# Patient Record
Sex: FEMALE | Race: OTHER | HISPANIC OR LATINO
[De-identification: names, ages, dates, MRNs, and addresses within clinical notes are randomized per-mention and may not be internally consistent; named-entity substitution may affect disease eponyms.]

---

## 2022-11-08 PROBLEM — Z00.00 ENCOUNTER FOR PREVENTIVE HEALTH EXAMINATION: Status: ACTIVE | Noted: 2022-11-08

## 2022-11-14 ENCOUNTER — ASOB RESULT (OUTPATIENT)
Age: 32
End: 2022-11-14

## 2022-11-14 ENCOUNTER — APPOINTMENT (OUTPATIENT)
Dept: ANTEPARTUM | Facility: CLINIC | Age: 32
End: 2022-11-14

## 2022-11-14 PROCEDURE — 76856 US EXAM PELVIC COMPLETE: CPT | Mod: 59

## 2022-11-14 PROCEDURE — 76830 TRANSVAGINAL US NON-OB: CPT

## 2023-04-17 ENCOUNTER — NON-APPOINTMENT (OUTPATIENT)
Age: 33
End: 2023-04-17

## 2023-04-17 ENCOUNTER — APPOINTMENT (OUTPATIENT)
Dept: OBGYN | Facility: CLINIC | Age: 33
End: 2023-04-17
Payer: MEDICAID

## 2023-04-17 VITALS
HEIGHT: 64 IN | DIASTOLIC BLOOD PRESSURE: 87 MMHG | BODY MASS INDEX: 28.34 KG/M2 | WEIGHT: 166 LBS | SYSTOLIC BLOOD PRESSURE: 123 MMHG

## 2023-04-17 DIAGNOSIS — E28.2 POLYCYSTIC OVARIAN SYNDROME: ICD-10-CM

## 2023-04-17 DIAGNOSIS — Z78.9 OTHER SPECIFIED HEALTH STATUS: ICD-10-CM

## 2023-04-17 DIAGNOSIS — Z82.49 FAMILY HISTORY OF ISCHEMIC HEART DISEASE AND OTHER DISEASES OF THE CIRCULATORY SYSTEM: ICD-10-CM

## 2023-04-17 PROCEDURE — 99385 PREV VISIT NEW AGE 18-39: CPT

## 2023-04-17 PROCEDURE — 99202 OFFICE O/P NEW SF 15 MIN: CPT | Mod: 25

## 2023-04-17 PROCEDURE — 81025 URINE PREGNANCY TEST: CPT

## 2023-04-17 NOTE — PLAN
[FreeTextEntry1] : IUP at 7w1d gestation not CW LMP \par +FHR\par Dating sono referral given\par \par Prenatals sent\par \par PAP smear done\par CG/CT - done\par \par RTO 2 weeks for NPN

## 2023-04-17 NOTE — PHYSICAL EXAM
[Chaperone Present] : A chaperone was present in the examining room during all aspects of the physical examination [Appropriately responsive] : appropriately responsive [Alert] : alert [No Acute Distress] : no acute distress [No Lymphadenopathy] : no lymphadenopathy [Regular Rate Rhythm] : regular rate rhythm [No Murmurs] : no murmurs [Soft] : soft [Clear to Auscultation B/L] : clear to auscultation bilaterally [Non-tender] : non-tender [Non-distended] : non-distended [No HSM] : No HSM [No Lesions] : no lesions [No Mass] : no mass [Oriented x3] : oriented x3 [Examination Of The Breasts] : a normal appearance [No Masses] : no breast masses were palpable [Labia Majora] : normal [Labia Minora] : normal [Normal] : normal [Enlarged ___ wks] : enlarged [unfilled] ~Uweeks [Uterine Adnexae] : normal [Tenderness] : nontender

## 2023-04-17 NOTE — HISTORY OF PRESENT ILLNESS
[N] : Patient does not use contraception [Y] : Positive pregnancy history [Frequency: Q ___ days] : menstrual periods occur approximately every [unfilled] days [Menarche Age: ____] : age at menarche was [unfilled] [FreeTextEntry1] : 32yo  with PCOS presents to establish care and confirm pregnancy\par LMP 03/15/2023\par \par  [PGxTotal] : 1 [PGHxFullTerm] : 0 [PGHxPremature] : 0 [PGHxAbortions] : 0 [PGHxABInduced] : 0 [[facility]] : 0 [PGHxABSpont] : 0 [PGHxEctopic] : 0 [PGHxMultBirths] : 0

## 2023-04-17 NOTE — PROCEDURE
[Cervical Pap Smear] : cervical Pap smear [Liquid Base] : liquid base [GC & Chlamydia via Pap] : GC & Chlamydia via Pap [Tolerated Well] : the patient tolerated the procedure well [No Complications] : there were no complications [Transvaginal OB Sonogram] : Transvaginal OB Sonogram [Intrauterine Pregnancy] : intrauterine pregnancy [Fetal Heart] : fetal heart present [CRL: ___ (mm)] : CRL - [unfilled]Umm [Current GA by Sonogram: ___ (wks)] : Current GA by Sonogram: [unfilled]Uwks [___ day(s)] : [unfilled] days [FreeTextEntry1] : IUP at 7w1d gestation \par + FHR\par \par

## 2023-04-18 LAB
C TRACH RRNA SPEC QL NAA+PROBE: NOT DETECTED
HCG UR QL: POSITIVE
HPV HIGH+LOW RISK DNA PNL CVX: NOT DETECTED
N GONORRHOEA RRNA SPEC QL NAA+PROBE: NOT DETECTED
QUALITY CONTROL: YES
SOURCE TP AMPLIFICATION: NORMAL

## 2023-04-25 LAB — CYTOLOGY CVX/VAG DOC THIN PREP: NORMAL

## 2023-05-05 ENCOUNTER — APPOINTMENT (OUTPATIENT)
Dept: ANTEPARTUM | Facility: CLINIC | Age: 33
End: 2023-05-05
Payer: MEDICAID

## 2023-05-05 ENCOUNTER — ASOB RESULT (OUTPATIENT)
Age: 33
End: 2023-05-05

## 2023-05-05 PROCEDURE — 76817 TRANSVAGINAL US OBSTETRIC: CPT

## 2023-05-14 ENCOUNTER — LABORATORY RESULT (OUTPATIENT)
Age: 33
End: 2023-05-14

## 2023-05-15 ENCOUNTER — APPOINTMENT (OUTPATIENT)
Dept: OBGYN | Facility: CLINIC | Age: 33
End: 2023-05-15
Payer: MEDICAID

## 2023-05-15 VITALS
SYSTOLIC BLOOD PRESSURE: 110 MMHG | DIASTOLIC BLOOD PRESSURE: 67 MMHG | WEIGHT: 167 LBS | HEIGHT: 64 IN | BODY MASS INDEX: 28.51 KG/M2

## 2023-05-15 DIAGNOSIS — Z83.3 FAMILY HISTORY OF DIABETES MELLITUS: ICD-10-CM

## 2023-05-15 DIAGNOSIS — Z32.01 ENCOUNTER FOR PREGNANCY TEST, RESULT POSITIVE: ICD-10-CM

## 2023-05-15 PROCEDURE — 99213 OFFICE O/P EST LOW 20 MIN: CPT | Mod: TH

## 2023-05-15 RX ORDER — PRENATAL WITH FERROUS FUM AND FOLIC ACID 3080; 920; 120; 400; 22; 1.84; 3; 20; 10; 1; 12; 200; 27; 25; 2 [IU]/1; [IU]/1; MG/1; [IU]/1; MG/1; MG/1; MG/1; MG/1; MG/1; MG/1; UG/1; MG/1; MG/1; MG/1; MG/1
27-1 TABLET ORAL
Qty: 30 | Refills: 8 | Status: ACTIVE | COMMUNITY
Start: 2023-04-17

## 2023-05-16 LAB
ABO + RH PNL BLD: NORMAL
ALBUMIN SERPL ELPH-MCNC: 4.3 G/DL
ALP BLD-CCNC: 47 U/L
ALT SERPL-CCNC: 9 U/L
ANION GAP SERPL CALC-SCNC: 18 MMOL/L
APPEARANCE: CLEAR
AST SERPL-CCNC: 12 U/L
BASOPHILS # BLD AUTO: 0.02 K/UL
BASOPHILS NFR BLD AUTO: 0.2 %
BILIRUB SERPL-MCNC: 0.2 MG/DL
BILIRUBIN URINE: NEGATIVE
BLD GP AB SCN SERPL QL: NORMAL
BLOOD URINE: NEGATIVE
BUN SERPL-MCNC: 6 MG/DL
CALCIUM SERPL-MCNC: 9.2 MG/DL
CHLORIDE SERPL-SCNC: 100 MMOL/L
CO2 SERPL-SCNC: 19 MMOL/L
COLOR: YELLOW
CREAT SERPL-MCNC: 0.5 MG/DL
EGFR: 129 ML/MIN/1.73M2
EOSINOPHIL # BLD AUTO: 0.08 K/UL
EOSINOPHIL NFR BLD AUTO: 1 %
ESTIMATED AVERAGE GLUCOSE: 105 MG/DL
GLUCOSE QUALITATIVE U: NEGATIVE MG/DL
GLUCOSE SERPL-MCNC: 42 MG/DL
HBA1C MFR BLD HPLC: 5.3 %
HCT VFR BLD CALC: 36.6 %
HGB BLD-MCNC: 11.5 G/DL
HIV1+2 AB SPEC QL IA.RAPID: NONREACTIVE
IMM GRANULOCYTES NFR BLD AUTO: 0.4 %
KETONES URINE: NEGATIVE MG/DL
LEUKOCYTE ESTERASE URINE: ABNORMAL
LYMPHOCYTES # BLD AUTO: 1.61 K/UL
LYMPHOCYTES NFR BLD AUTO: 19.4 %
MAN DIFF?: NORMAL
MCHC RBC-ENTMCNC: 30.1 PG
MCHC RBC-ENTMCNC: 31.4 GM/DL
MCV RBC AUTO: 95.8 FL
MONOCYTES # BLD AUTO: 0.46 K/UL
MONOCYTES NFR BLD AUTO: 5.5 %
NEUTROPHILS # BLD AUTO: 6.1 K/UL
NEUTROPHILS NFR BLD AUTO: 73.5 %
NITRITE URINE: NEGATIVE
PH URINE: 6
PLATELET # BLD AUTO: 281 K/UL
POTASSIUM SERPL-SCNC: 3.5 MMOL/L
PROT SERPL-MCNC: 7.2 G/DL
PROTEIN URINE: NEGATIVE MG/DL
RBC # BLD: 3.82 M/UL
RBC # FLD: 13.4 %
SODIUM SERPL-SCNC: 137 MMOL/L
SPECIFIC GRAVITY URINE: 1.01
TSH SERPL-ACNC: 1.29 UIU/ML
UROBILINOGEN URINE: 0.2 MG/DL
WBC # FLD AUTO: 8.3 K/UL

## 2023-05-17 LAB
CMV IGG SERPL QL: >10 U/ML
CMV IGG SERPL-IMP: POSITIVE
CMV IGM SERPL QL: <8 AU/ML
CMV IGM SERPL QL: NEGATIVE
HBV SURFACE AG SER QL: NONREACTIVE
HCV AB SER QL: NONREACTIVE
HCV S/CO RATIO: 0.12 S/CO
HGB A MFR BLD: 96.9 %
HGB A2 MFR BLD: 3.1 %
HGB FRACT BLD-IMP: NORMAL
MEV IGG FLD QL IA: 70.8 AU/ML
MEV IGG+IGM SER-IMP: POSITIVE
MUV AB SER-ACNC: POSITIVE
MUV IGG SER QL IA: 55.1 AU/ML
RUBV IGG FLD-ACNC: 20.7 INDEX
RUBV IGG SER-IMP: POSITIVE
T GONDII AB SER-IMP: NEGATIVE
T GONDII AB SER-IMP: NEGATIVE
T GONDII IGG SER QL: <3 IU/ML
T GONDII IGM SER QL: 3.7 AU/ML
T PALLIDUM AB SER QL IA: NEGATIVE
VZV AB TITR SER: POSITIVE
VZV IGG SER IF-ACNC: 721.2 INDEX

## 2023-05-18 LAB
M TB IFN-G BLD-IMP: NEGATIVE
QUANTIFERON TB PLUS MITOGEN MINUS NIL: 8.19 IU/ML
QUANTIFERON TB PLUS NIL: 0.02 IU/ML
QUANTIFERON TB PLUS TB1 MINUS NIL: 0 IU/ML
QUANTIFERON TB PLUS TB2 MINUS NIL: 0 IU/ML

## 2023-05-19 LAB
AR GENE MUT ANL BLD/T: NORMAL
LEAD BLD-MCNC: <1 UG/DL

## 2023-05-22 LAB
B19V IGG SER QL IA: NEGATIVE
B19V IGG+IGM SER-IMP: NORMAL
B19V IGM FLD-ACNC: NEGATIVE
CFTR MUT TESTED BLD/T: NEGATIVE
FMR1 GENE MUT ANL BLD/T: NORMAL

## 2023-05-24 ENCOUNTER — APPOINTMENT (OUTPATIENT)
Dept: MATERNAL FETAL MEDICINE | Facility: CLINIC | Age: 33
End: 2023-05-24
Payer: MEDICAID

## 2023-05-24 ENCOUNTER — ASOB RESULT (OUTPATIENT)
Age: 33
End: 2023-05-24

## 2023-05-24 ENCOUNTER — NON-APPOINTMENT (OUTPATIENT)
Age: 33
End: 2023-05-24

## 2023-05-24 PROCEDURE — 99202 OFFICE O/P NEW SF 15 MIN: CPT | Mod: TH,95

## 2023-05-24 RX ORDER — ONDANSETRON 4 MG/1
4 TABLET, ORALLY DISINTEGRATING ORAL
Qty: 14 | Refills: 2 | Status: ACTIVE | COMMUNITY
Start: 2023-05-24 | End: 1900-01-01

## 2023-05-25 ENCOUNTER — APPOINTMENT (OUTPATIENT)
Dept: ANTEPARTUM | Facility: CLINIC | Age: 33
End: 2023-05-25
Payer: MEDICAID

## 2023-05-25 ENCOUNTER — ASOB RESULT (OUTPATIENT)
Age: 33
End: 2023-05-25

## 2023-05-25 PROCEDURE — 36416 COLLJ CAPILLARY BLOOD SPEC: CPT

## 2023-05-25 PROCEDURE — 76813 OB US NUCHAL MEAS 1 GEST: CPT

## 2023-05-30 LAB
ADDITIONAL US: NORMAL
COMMENTS: AFP: NORMAL
CRL SCAN TWIN B: NORMAL
CRL SCAN: NORMAL
CROWN RUMP LENGTH TWIN B: NORMAL
CROWN RUMP LENGTH: 70 MM
DOWN SYNDROME AGE RISK: NORMAL
DOWN SYNDROME INTERPRETATION: NORMAL
DOWN SYNDROME SCREENING RISK: NORMAL
GEST. AGE ON COLLECTION DATE: 13 WEEKS
HCG MOM: 1.07
HCG VALUE: 90.6 IU/ML
MATERNAL AGE AT EDD: 31.7 YR
NOTE: AFP: NORMAL
NT MOM TWIN B: NORMAL
NT TWIN B: NORMAL
NUCHAL TRANSLUCENCY (NT): 1.7 MM
NUCHAL TRANSLUCENCY MOM: 0.94
NUMBER OF FETUSES: 1
PAPP-A MOM: 0.69
PAPP-A VALUE: 743.1 NG/ML
RACE: NORMAL
RESULTS AFP: NORMAL
SONOGRAPHER ID#: NORMAL
SUBMIT PART 2 SAMPLE USING: NORMAL
TEST RESULTS: AFP: NORMAL
TRISOMY 18 AGE RISK: NORMAL
TRISOMY 18 INTERPRETATION: NORMAL
TRISOMY 18 SCREENING RISK: NORMAL
WEIGHT AFP: 166 LBS

## 2023-06-01 ENCOUNTER — APPOINTMENT (OUTPATIENT)
Dept: ANTEPARTUM | Facility: CLINIC | Age: 33
End: 2023-06-01
Payer: MEDICAID

## 2023-06-01 PROCEDURE — 36415 COLL VENOUS BLD VENIPUNCTURE: CPT

## 2023-06-09 ENCOUNTER — NON-APPOINTMENT (OUTPATIENT)
Age: 33
End: 2023-06-09

## 2023-06-16 ENCOUNTER — APPOINTMENT (OUTPATIENT)
Dept: OBGYN | Facility: CLINIC | Age: 33
End: 2023-06-16
Payer: MEDICAID

## 2023-06-16 VITALS
DIASTOLIC BLOOD PRESSURE: 68 MMHG | HEIGHT: 64 IN | BODY MASS INDEX: 28.51 KG/M2 | WEIGHT: 167 LBS | SYSTOLIC BLOOD PRESSURE: 112 MMHG

## 2023-06-16 PROCEDURE — 99213 OFFICE O/P EST LOW 20 MIN: CPT | Mod: TH

## 2023-06-19 ENCOUNTER — NON-APPOINTMENT (OUTPATIENT)
Age: 33
End: 2023-06-19

## 2023-06-19 ENCOUNTER — APPOINTMENT (OUTPATIENT)
Dept: ANTEPARTUM | Facility: CLINIC | Age: 33
End: 2023-06-19

## 2023-06-23 ENCOUNTER — APPOINTMENT (OUTPATIENT)
Dept: ANTEPARTUM | Facility: CLINIC | Age: 33
End: 2023-06-23
Payer: MEDICAID

## 2023-06-23 PROCEDURE — 36415 COLL VENOUS BLD VENIPUNCTURE: CPT

## 2023-06-26 LAB
ADDITIONAL US: NORMAL
AFP MOM: 0.85
AFP VALUE: 29.5 NG/ML
COLLECTED ON 2: NORMAL
COLLECTED ON: NORMAL
CRL SCAN TWIN B: NORMAL
CRL SCAN: NORMAL
CROWN RUMP LENGTH TWIN B: NORMAL
CROWN RUMP LENGTH: 70 MM
DIA MOM: 0.81
DIA VALUE: 113.6 PG/ML
DOWN SYNDROME AGE RISK: NORMAL
DOWN SYNDROME INTERPRETATION: NORMAL
DOWN SYNDROME SCREENING RISK: NORMAL
FIRST TRIMESTER SAMPLE: NORMAL
GEST. AGE ON COLLECTION DATE: 13 WEEKS
GESTATIONAL AGE: 17.1 WEEKS
HCG MOM: 1.06
HCG VALUE: 29.2 IU/ML
INSULIN DEP DIABETES: NO
MATERNAL AGE AT EDD: 31.7 YR
NT MOM TWIN B: NORMAL
NT TWIN B: NORMAL
NUCHAL TRANSLUCENCY (NT): 1.7 MM
NUCHAL TRANSLUCENCY MOM: 0.94
NUMBER OF FETUSES: 1
OPEN SPINA BIFIDA: NORMAL
OSB INTERPRETATION: NORMAL
PAPP-A MOM: 0.69
PAPP-A VALUE: 743.1 NG/ML
RACE: NORMAL
SECOND TRIMESTER SAMPLE: NORMAL
SEQUENTIAL 2 COMMENTS: NORMAL
SEQUENTIAL 2 NOTE: NORMAL
SEQUENTIAL 2 RESULTS: NORMAL
SEQUENTIAL 2 TEST RESULTS: NORMAL
SONOGRAPHER ID#: NORMAL
TRISOMY 18 AGE RISK: NORMAL
TRISOMY 18 INTERPRETATION: NORMAL
TRISOMY 18 SCREENING RISK: NORMAL
UE3 MOM: 0.81
UE3 VALUE: 0.93 NG/ML
WEIGHT AFP: 166 LBS
WEIGHT: 169 LBS

## 2023-07-13 ENCOUNTER — APPOINTMENT (OUTPATIENT)
Dept: OBGYN | Facility: CLINIC | Age: 33
End: 2023-07-13
Payer: MEDICAID

## 2023-07-13 VITALS
BODY MASS INDEX: 29.53 KG/M2 | WEIGHT: 173 LBS | HEIGHT: 64 IN | DIASTOLIC BLOOD PRESSURE: 68 MMHG | SYSTOLIC BLOOD PRESSURE: 110 MMHG

## 2023-07-13 PROCEDURE — 99213 OFFICE O/P EST LOW 20 MIN: CPT | Mod: TH

## 2023-07-14 ENCOUNTER — APPOINTMENT (OUTPATIENT)
Dept: ANTEPARTUM | Facility: CLINIC | Age: 33
End: 2023-07-14

## 2023-07-21 ENCOUNTER — APPOINTMENT (OUTPATIENT)
Dept: ANTEPARTUM | Facility: CLINIC | Age: 33
End: 2023-07-21
Payer: MEDICAID

## 2023-07-21 ENCOUNTER — ASOB RESULT (OUTPATIENT)
Age: 33
End: 2023-07-21

## 2023-07-21 PROCEDURE — 76817 TRANSVAGINAL US OBSTETRIC: CPT

## 2023-07-21 PROCEDURE — 76811 OB US DETAILED SNGL FETUS: CPT

## 2023-07-27 ENCOUNTER — NON-APPOINTMENT (OUTPATIENT)
Age: 33
End: 2023-07-27

## 2023-07-27 LAB
BILIRUB UR QL STRIP: NORMAL
GLUCOSE UR-MCNC: NORMAL
HCG UR QL: 0.2 EU/DL
HGB UR QL STRIP.AUTO: NORMAL
KETONES UR-MCNC: NORMAL
LEUKOCYTE ESTERASE UR QL STRIP: NORMAL
NITRITE UR QL STRIP: NORMAL
PH UR STRIP: 6
PROT UR STRIP-MCNC: NORMAL
SP GR UR STRIP: 1.05

## 2023-08-10 ENCOUNTER — APPOINTMENT (OUTPATIENT)
Dept: OBGYN | Facility: CLINIC | Age: 33
End: 2023-08-10
Payer: MEDICAID

## 2023-08-10 VITALS
SYSTOLIC BLOOD PRESSURE: 114 MMHG | DIASTOLIC BLOOD PRESSURE: 70 MMHG | HEIGHT: 64 IN | BODY MASS INDEX: 30.39 KG/M2 | WEIGHT: 178 LBS

## 2023-08-10 LAB
BILIRUB UR QL STRIP: NORMAL
GLUCOSE UR-MCNC: NORMAL
HCG UR QL: 0.2 EU/DL
HGB UR QL STRIP.AUTO: NORMAL
KETONES UR-MCNC: NORMAL
LEUKOCYTE ESTERASE UR QL STRIP: NORMAL
NITRITE UR QL STRIP: NORMAL
PH UR STRIP: 7
PROT UR STRIP-MCNC: NORMAL
SP GR UR STRIP: 1.01

## 2023-08-10 PROCEDURE — 99213 OFFICE O/P EST LOW 20 MIN: CPT | Mod: TH,25

## 2023-08-10 PROCEDURE — 81003 URINALYSIS AUTO W/O SCOPE: CPT | Mod: NC,QW

## 2023-08-18 DIAGNOSIS — Z13.71 ENCOUNTER FOR NONPROCREATIVE SCREENING FOR GENETIC DISEASE CARRIER STATUS: ICD-10-CM

## 2023-08-18 DIAGNOSIS — O35.10X0 MATERNAL CARE FOR (SUSPECTED) CHROMOSOMAL ABNORMALITY IN FETUS, UNSPECIFIED, NOT APPLICABLE OR UNSPECIFIED: ICD-10-CM

## 2023-08-18 DIAGNOSIS — Z3A.19 19 WEEKS GESTATION OF PREGNANCY: ICD-10-CM

## 2023-08-18 DIAGNOSIS — Z34.91 ENCOUNTER FOR SUPERVISION OF NORMAL PREGNANCY, UNSPECIFIED, FIRST TRIMESTER: ICD-10-CM

## 2023-08-18 DIAGNOSIS — Z87.42 PERSONAL HISTORY OF OTHER DISEASES OF THE FEMALE GENITAL TRACT: ICD-10-CM

## 2023-08-27 ENCOUNTER — LABORATORY RESULT (OUTPATIENT)
Age: 33
End: 2023-08-27

## 2023-09-06 ENCOUNTER — NON-APPOINTMENT (OUTPATIENT)
Age: 33
End: 2023-09-06

## 2023-09-06 ENCOUNTER — APPOINTMENT (OUTPATIENT)
Dept: OBGYN | Facility: CLINIC | Age: 33
End: 2023-09-06
Payer: MEDICAID

## 2023-09-06 VITALS
SYSTOLIC BLOOD PRESSURE: 122 MMHG | WEIGHT: 178.38 LBS | DIASTOLIC BLOOD PRESSURE: 70 MMHG | BODY MASS INDEX: 30.45 KG/M2 | HEIGHT: 64 IN

## 2023-09-06 PROCEDURE — 99213 OFFICE O/P EST LOW 20 MIN: CPT | Mod: TH

## 2023-09-06 RX ORDER — METOCLOPRAMIDE 10 MG/1
10 TABLET ORAL 4 TIMES DAILY
Qty: 120 | Refills: 1 | Status: ACTIVE | COMMUNITY
Start: 2023-09-06 | End: 1900-01-01

## 2023-09-06 RX ORDER — DOCUSATE SODIUM 100 MG/1
100 CAPSULE ORAL TWICE DAILY
Qty: 60 | Refills: 3 | Status: ACTIVE | COMMUNITY
Start: 2023-09-06 | End: 1900-01-01

## 2023-09-13 DIAGNOSIS — Z3A.27 27 WEEKS GESTATION OF PREGNANCY: ICD-10-CM

## 2023-09-19 ENCOUNTER — NON-APPOINTMENT (OUTPATIENT)
Age: 33
End: 2023-09-19

## 2023-09-20 ENCOUNTER — TRANSCRIPTION ENCOUNTER (OUTPATIENT)
Age: 33
End: 2023-09-20

## 2023-09-20 ENCOUNTER — APPOINTMENT (OUTPATIENT)
Dept: OBGYN | Facility: CLINIC | Age: 33
End: 2023-09-20
Payer: MEDICAID

## 2023-09-20 VITALS — SYSTOLIC BLOOD PRESSURE: 100 MMHG | DIASTOLIC BLOOD PRESSURE: 70 MMHG | BODY MASS INDEX: 30.21 KG/M2 | WEIGHT: 176 LBS

## 2023-09-20 PROCEDURE — 99213 OFFICE O/P EST LOW 20 MIN: CPT | Mod: TH,25

## 2023-09-20 PROCEDURE — 81003 URINALYSIS AUTO W/O SCOPE: CPT | Mod: QW

## 2023-09-21 LAB
BILIRUB UR QL STRIP: NORMAL
GLUCOSE 1H P 50 G GLC PO SERPL-MCNC: 169 MG/DL
GLUCOSE UR-MCNC: 100
HCG UR QL: 0.2 EU/DL
HGB UR QL STRIP.AUTO: NORMAL
KETONES UR-MCNC: NORMAL
LEUKOCYTE ESTERASE UR QL STRIP: NORMAL
NITRITE UR QL STRIP: NORMAL
PH UR STRIP: 7
PROT UR STRIP-MCNC: NORMAL
SP GR UR STRIP: 1.02

## 2023-09-26 DIAGNOSIS — Z3A.29 29 WEEKS GESTATION OF PREGNANCY: ICD-10-CM

## 2023-10-02 ENCOUNTER — APPOINTMENT (OUTPATIENT)
Dept: OBGYN | Facility: CLINIC | Age: 33
End: 2023-10-02
Payer: MEDICAID

## 2023-10-02 VITALS
BODY MASS INDEX: 31.41 KG/M2 | HEIGHT: 64 IN | SYSTOLIC BLOOD PRESSURE: 110 MMHG | DIASTOLIC BLOOD PRESSURE: 70 MMHG | WEIGHT: 184 LBS

## 2023-10-02 LAB
BILIRUB UR QL STRIP: NORMAL
GLUCOSE UR-MCNC: NORMAL
HCG UR QL: 0.2 EU/DL
HGB UR QL STRIP.AUTO: NORMAL
KETONES UR-MCNC: NORMAL
LEUKOCYTE ESTERASE UR QL STRIP: ABNORMAL
NITRITE UR QL STRIP: NORMAL
PH UR STRIP: 6.5
PROT UR STRIP-MCNC: NORMAL
SP GR UR STRIP: 1.02

## 2023-10-02 PROCEDURE — 81003 URINALYSIS AUTO W/O SCOPE: CPT | Mod: QW

## 2023-10-02 PROCEDURE — 99213 OFFICE O/P EST LOW 20 MIN: CPT | Mod: TH,25

## 2023-10-13 ENCOUNTER — ASOB RESULT (OUTPATIENT)
Age: 33
End: 2023-10-13

## 2023-10-13 ENCOUNTER — APPOINTMENT (OUTPATIENT)
Dept: ANTEPARTUM | Facility: CLINIC | Age: 33
End: 2023-10-13
Payer: MEDICAID

## 2023-10-13 PROCEDURE — 76816 OB US FOLLOW-UP PER FETUS: CPT

## 2023-10-16 ENCOUNTER — APPOINTMENT (OUTPATIENT)
Dept: OBGYN | Facility: CLINIC | Age: 33
End: 2023-10-16
Payer: MEDICAID

## 2023-10-16 VITALS
WEIGHT: 184.56 LBS | BODY MASS INDEX: 31.51 KG/M2 | HEIGHT: 64 IN | SYSTOLIC BLOOD PRESSURE: 124 MMHG | DIASTOLIC BLOOD PRESSURE: 84 MMHG

## 2023-10-16 DIAGNOSIS — Z3A.31 31 WEEKS GESTATION OF PREGNANCY: ICD-10-CM

## 2023-10-16 PROCEDURE — 81003 URINALYSIS AUTO W/O SCOPE: CPT | Mod: QW

## 2023-10-16 PROCEDURE — 99213 OFFICE O/P EST LOW 20 MIN: CPT | Mod: TH,25

## 2023-10-24 ENCOUNTER — MED ADMIN CHARGE (OUTPATIENT)
Age: 33
End: 2023-10-24

## 2023-10-24 DIAGNOSIS — Z3A.33 33 WEEKS GESTATION OF PREGNANCY: ICD-10-CM

## 2023-10-31 ENCOUNTER — APPOINTMENT (OUTPATIENT)
Dept: OBGYN | Facility: CLINIC | Age: 33
End: 2023-10-31
Payer: MEDICAID

## 2023-10-31 DIAGNOSIS — Z23 ENCOUNTER FOR IMMUNIZATION: ICD-10-CM

## 2023-10-31 DIAGNOSIS — Z34.92 ENCOUNTER FOR SUPERVISION OF NORMAL PREGNANCY, UNSPECIFIED, SECOND TRIMESTER: ICD-10-CM

## 2023-10-31 DIAGNOSIS — R73.09 OTHER ABNORMAL GLUCOSE: ICD-10-CM

## 2023-10-31 LAB
BILIRUB UR QL STRIP: NEGATIVE
CLARITY UR: CLEAR
COLLECTION METHOD: NORMAL
GLUCOSE UR-MCNC: NEGATIVE
HCG UR QL: 0.2 EU/DL
HGB UR QL STRIP.AUTO: NORMAL
KETONES UR-MCNC: NEGATIVE
LEUKOCYTE ESTERASE UR QL STRIP: NORMAL
NITRITE UR QL STRIP: NEGATIVE
PH UR STRIP: 7
PROT UR STRIP-MCNC: NORMAL
SP GR UR STRIP: 1.02

## 2023-10-31 PROCEDURE — 81003 URINALYSIS AUTO W/O SCOPE: CPT | Mod: QW

## 2023-10-31 PROCEDURE — 99213 OFFICE O/P EST LOW 20 MIN: CPT | Mod: TH,25

## 2023-11-02 PROBLEM — Z34.92 SECOND TRIMESTER PREGNANCY: Status: RESOLVED | Noted: 2023-08-21 | Resolved: 2023-11-02

## 2023-11-02 PROBLEM — Z23 ENCOUNTER FOR IMMUNIZATION: Status: RESOLVED | Noted: 2023-10-24 | Resolved: 2023-11-02

## 2023-11-02 PROBLEM — R73.09 ELEVATED GLUCOSE: Status: RESOLVED | Noted: 2023-09-20 | Resolved: 2023-11-02

## 2023-11-09 ENCOUNTER — APPOINTMENT (OUTPATIENT)
Dept: OBGYN | Facility: CLINIC | Age: 33
End: 2023-11-09
Payer: MEDICAID

## 2023-11-09 VITALS
WEIGHT: 190.6 LBS | DIASTOLIC BLOOD PRESSURE: 70 MMHG | HEIGHT: 64 IN | BODY MASS INDEX: 32.54 KG/M2 | SYSTOLIC BLOOD PRESSURE: 120 MMHG

## 2023-11-09 DIAGNOSIS — Z3A.36 36 WEEKS GESTATION OF PREGNANCY: ICD-10-CM

## 2023-11-09 PROCEDURE — 99213 OFFICE O/P EST LOW 20 MIN: CPT | Mod: TH,25

## 2023-11-09 PROCEDURE — 81003 URINALYSIS AUTO W/O SCOPE: CPT | Mod: QW

## 2023-11-13 LAB
BASOPHILS # BLD AUTO: 0.01 K/UL
BASOPHILS NFR BLD AUTO: 0.1 %
BILIRUB UR QL STRIP: NEGATIVE
CLARITY UR: CLEAR
COLLECTION METHOD: NORMAL
EOSINOPHIL # BLD AUTO: 0.09 K/UL
EOSINOPHIL NFR BLD AUTO: 1.1 %
GLUCOSE UR-MCNC: NEGATIVE
GP B STREP DNA SPEC QL NAA+PROBE: NOT DETECTED
HCG UR QL: 0.2 EU/DL
HCT VFR BLD CALC: 34 %
HGB BLD-MCNC: 11.3 G/DL
HGB UR QL STRIP.AUTO: NEGATIVE
HIV1+2 AB SPEC QL IA.RAPID: NONREACTIVE
IMM GRANULOCYTES NFR BLD AUTO: 0.7 %
KETONES UR-MCNC: NEGATIVE
LEUKOCYTE ESTERASE UR QL STRIP: NORMAL
LYMPHOCYTES # BLD AUTO: 2.08 K/UL
LYMPHOCYTES NFR BLD AUTO: 25.2 %
MAN DIFF?: NORMAL
MCHC RBC-ENTMCNC: 29.5 PG
MCHC RBC-ENTMCNC: 33.2 GM/DL
MCV RBC AUTO: 88.8 FL
MONOCYTES # BLD AUTO: 0.67 K/UL
MONOCYTES NFR BLD AUTO: 8.1 %
NEUTROPHILS # BLD AUTO: 5.35 K/UL
NEUTROPHILS NFR BLD AUTO: 64.8 %
NITRITE UR QL STRIP: NEGATIVE
PH UR STRIP: 7
PLATELET # BLD AUTO: 238 K/UL
PROT UR STRIP-MCNC: NEGATIVE
RBC # BLD: 3.83 M/UL
RBC # FLD: 14.9 %
SOURCE GBS: NORMAL
SP GR UR STRIP: 1.01
T PALLIDUM AB SER QL IA: NEGATIVE
WBC # FLD AUTO: 8.26 K/UL

## 2023-11-17 ENCOUNTER — ASOB RESULT (OUTPATIENT)
Age: 33
End: 2023-11-17

## 2023-11-17 ENCOUNTER — APPOINTMENT (OUTPATIENT)
Dept: ANTEPARTUM | Facility: CLINIC | Age: 33
End: 2023-11-17
Payer: MEDICAID

## 2023-11-17 PROCEDURE — 76819 FETAL BIOPHYS PROFIL W/O NST: CPT

## 2023-11-17 PROCEDURE — 76816 OB US FOLLOW-UP PER FETUS: CPT

## 2023-11-21 ENCOUNTER — NON-APPOINTMENT (OUTPATIENT)
Age: 33
End: 2023-11-21

## 2023-11-21 ENCOUNTER — APPOINTMENT (OUTPATIENT)
Dept: OBGYN | Facility: CLINIC | Age: 33
End: 2023-11-21
Payer: MEDICAID

## 2023-11-21 ENCOUNTER — OUTPATIENT (OUTPATIENT)
Dept: OUTPATIENT SERVICES | Facility: HOSPITAL | Age: 33
LOS: 1 days | End: 2023-11-21
Payer: COMMERCIAL

## 2023-11-21 VITALS
HEART RATE: 81 BPM | RESPIRATION RATE: 16 BRPM | DIASTOLIC BLOOD PRESSURE: 71 MMHG | SYSTOLIC BLOOD PRESSURE: 109 MMHG | TEMPERATURE: 98 F

## 2023-11-21 VITALS
DIASTOLIC BLOOD PRESSURE: 60 MMHG | BODY MASS INDEX: 32.49 KG/M2 | HEIGHT: 64 IN | SYSTOLIC BLOOD PRESSURE: 96 MMHG | WEIGHT: 190.3 LBS

## 2023-11-21 VITALS — HEART RATE: 89 BPM | DIASTOLIC BLOOD PRESSURE: 60 MMHG | SYSTOLIC BLOOD PRESSURE: 110 MMHG

## 2023-11-21 DIAGNOSIS — O26.899 OTHER SPECIFIED PREGNANCY RELATED CONDITIONS, UNSPECIFIED TRIMESTER: ICD-10-CM

## 2023-11-21 PROCEDURE — 83986 ASSAY PH BODY FLUID NOS: CPT

## 2023-11-21 PROCEDURE — 36415 COLL VENOUS BLD VENIPUNCTURE: CPT

## 2023-11-21 PROCEDURE — G0463: CPT

## 2023-11-21 PROCEDURE — 87491 CHLMYD TRACH DNA AMP PROBE: CPT

## 2023-11-21 PROCEDURE — 59025 FETAL NON-STRESS TEST: CPT

## 2023-11-21 PROCEDURE — 99213 OFFICE O/P EST LOW 20 MIN: CPT | Mod: TH,25

## 2023-11-21 PROCEDURE — 99283 EMERGENCY DEPT VISIT LOW MDM: CPT

## 2023-11-21 PROCEDURE — 87800 DETECT AGNT MULT DNA DIREC: CPT

## 2023-11-21 PROCEDURE — 81003 URINALYSIS AUTO W/O SCOPE: CPT | Mod: QW

## 2023-11-21 PROCEDURE — 87591 N.GONORRHOEAE DNA AMP PROB: CPT

## 2023-11-21 NOTE — OB PROVIDER TRIAGE NOTE - NSHPPHYSICALEXAM_GEN_ALL_CORE
T(C): 36.8 (11-21-23 @ 18:17), Max: 36.8 (11-21-23 @ 18:04)  HR: 81 (11-21-23 @ 18:17) (81 - 81)  BP: 109/71 (11-21-23 @ 18:17) (109/71 - 109/71)  RR: 16 (11-21-23 @ 18:17) (16 - 16)  SpO2: --  Gen: NAD, well-appearing  Heart: S1 S2, RRR  Lungs: CTAB  Abd: soft, gravid  Ext: non-edematous, non-tender     SSE: cervix visualized, closed and without any signs of bleeding or drainage, no pooling   FHT: 120 baseline, moderate variability, + accels, -decels  [place]: Irritability

## 2023-11-21 NOTE — OB PROVIDER TRIAGE NOTE - HISTORY OF PRESENT ILLNESS
JOSÉ ANTONIO RUANO is a 33y  at 38w5d GA who presents to L&D for yellow discharge that started this morning.     Pt denies vaginal bleeding, contractions and leakage of fluid. She endorses good fetal movement.   Pt denies trauma.   Pt denies headaches, visual disturbances, RUQ pain, epigastric pain and new-onset edema.   She denies any urinary complaints.   She denies fevers, chills, nausea, vomiting.   She denies shortness of breath, chest pain, and palpitations.    Pregnancy course is  uncomplicated.     POB: G1  PGYN: -fibroids/-cysts, denied STD hx, denies abnormal PAPs  PMH: Denies  PSH: Denies  SH: Denies tobacco use, EtOH use and illicit drug use during the pregnancy; Feels safe at home  Meds: PNV  All: NKDA

## 2023-11-21 NOTE — OB PROVIDER TRIAGE NOTE - NSOBPROVIDERNOTE_OBGYN_ALL_OB_FT
A/P: 33y  at 38w5d GA who presents to L&D for rule out rupture    #Rule out rupture  No fluid on exam; nitrazine negative  BV/GC/CT cultures sent  Fetus: Reactive   [place]: Irritability  Dispo: Discharge home with labor precautions    Discussed with Dr. Wick

## 2023-11-21 NOTE — OB PROVIDER TRIAGE NOTE - ATTENDING COMMENTS
33y  at 38w5d GA who presents to L&D for yellow discharge that started this morning.     Pt denies vaginal bleeding, contractions and leakage of fluid. She endorses good fetal movement.   Pt denies trauma.   Pt denies headaches, visual disturbances, RUQ pain, epigastric pain and new-onset edema.   She denies any urinary complaints.   She denies fevers, chills, nausea, vomiting.   She denies shortness of breath, chest pain, and palpitations.    Gen: NAD, well-appearing  Heart: S1 S2, RRR  Lungs: CTAB  Abd: soft, gravid  Ext: non-edematous, non-tender     SSE: cervix visualized, closed and without any signs of bleeding or drainage, no pooling   FHT: 120 baseline, moderate variability, + accels, -decels  FHT reactive  [place]: Irritability    #Rule out rupture  No fluid on exam; nitrazine negative  BV/GC/CT cultures sent  follow up with Primary OB  fkc and labor warnings given  dc home

## 2023-11-22 LAB
BILIRUB UR QL STRIP: NORMAL
C TRACH RRNA SPEC QL NAA+PROBE: SIGNIFICANT CHANGE UP
C TRACH RRNA SPEC QL NAA+PROBE: SIGNIFICANT CHANGE UP
CLARITY UR: CLEAR
COLLECTION METHOD: NORMAL
GLUCOSE UR-MCNC: NORMAL
HCG UR QL: 1 EU/DL
HGB UR QL STRIP.AUTO: NORMAL
KETONES UR-MCNC: NORMAL
LEUKOCYTE ESTERASE UR QL STRIP: NORMAL
N GONORRHOEA RRNA SPEC QL NAA+PROBE: SIGNIFICANT CHANGE UP
N GONORRHOEA RRNA SPEC QL NAA+PROBE: SIGNIFICANT CHANGE UP
NITRITE UR QL STRIP: NORMAL
PH UR STRIP: 6.5
PROT UR STRIP-MCNC: NORMAL
SP GR UR STRIP: 1.02

## 2023-11-23 DIAGNOSIS — O99.891 OTHER SPECIFIED DISEASES AND CONDITIONS COMPLICATING PREGNANCY: ICD-10-CM

## 2023-11-23 DIAGNOSIS — N89.8 OTHER SPECIFIED NONINFLAMMATORY DISORDERS OF VAGINA: ICD-10-CM

## 2023-11-23 DIAGNOSIS — Z3A.38 38 WEEKS GESTATION OF PREGNANCY: ICD-10-CM

## 2023-11-23 LAB
CANDIDA AB TITR SER: SIGNIFICANT CHANGE UP
CANDIDA AB TITR SER: SIGNIFICANT CHANGE UP
G VAGINALIS DNA SPEC QL NAA+PROBE: DETECTED
G VAGINALIS DNA SPEC QL NAA+PROBE: DETECTED
T VAGINALIS SPEC QL WET PREP: SIGNIFICANT CHANGE UP
T VAGINALIS SPEC QL WET PREP: SIGNIFICANT CHANGE UP

## 2023-11-27 ENCOUNTER — NON-APPOINTMENT (OUTPATIENT)
Age: 33
End: 2023-11-27

## 2023-11-27 ENCOUNTER — INPATIENT (INPATIENT)
Facility: HOSPITAL | Age: 33
LOS: 2 days | Discharge: HOME | End: 2023-11-30
Attending: OBSTETRICS & GYNECOLOGY | Admitting: OBSTETRICS & GYNECOLOGY
Payer: COMMERCIAL

## 2023-11-27 ENCOUNTER — APPOINTMENT (OUTPATIENT)
Dept: OBGYN | Facility: CLINIC | Age: 33
End: 2023-11-27
Payer: MEDICAID

## 2023-11-27 VITALS
WEIGHT: 190.19 LBS | BODY MASS INDEX: 32.47 KG/M2 | HEIGHT: 64 IN | SYSTOLIC BLOOD PRESSURE: 112 MMHG | DIASTOLIC BLOOD PRESSURE: 80 MMHG

## 2023-11-27 VITALS — HEART RATE: 82 BPM | OXYGEN SATURATION: 100 %

## 2023-11-27 DIAGNOSIS — O26.893 OTHER SPECIFIED PREGNANCY RELATED CONDITIONS, THIRD TRIMESTER: ICD-10-CM

## 2023-11-27 DIAGNOSIS — Z3A.38 38 WEEKS GESTATION OF PREGNANCY: ICD-10-CM

## 2023-11-27 DIAGNOSIS — Z3A.39 39 WEEKS GESTATION OF PREGNANCY: ICD-10-CM

## 2023-11-27 DIAGNOSIS — Z3A.37 37 WEEKS GESTATION OF PREGNANCY: ICD-10-CM

## 2023-11-27 DIAGNOSIS — O26.899 OTHER SPECIFIED PREGNANCY RELATED CONDITIONS, UNSPECIFIED TRIMESTER: ICD-10-CM

## 2023-11-27 LAB
BASOPHILS # BLD AUTO: 0.02 K/UL — SIGNIFICANT CHANGE UP (ref 0–0.2)
BASOPHILS # BLD AUTO: 0.02 K/UL — SIGNIFICANT CHANGE UP (ref 0–0.2)
BASOPHILS NFR BLD AUTO: 0.2 % — SIGNIFICANT CHANGE UP (ref 0–2)
BASOPHILS NFR BLD AUTO: 0.2 % — SIGNIFICANT CHANGE UP (ref 0–2)
BILIRUB UR QL STRIP: NEGATIVE
BLD GP AB SCN SERPL QL: SIGNIFICANT CHANGE UP
BLD GP AB SCN SERPL QL: SIGNIFICANT CHANGE UP
EOSINOPHIL # BLD AUTO: 0.04 K/UL — SIGNIFICANT CHANGE UP (ref 0–0.5)
EOSINOPHIL # BLD AUTO: 0.04 K/UL — SIGNIFICANT CHANGE UP (ref 0–0.5)
EOSINOPHIL NFR BLD AUTO: 0.5 % — SIGNIFICANT CHANGE UP (ref 0–6)
EOSINOPHIL NFR BLD AUTO: 0.5 % — SIGNIFICANT CHANGE UP (ref 0–6)
GLUCOSE UR-MCNC: 100
HCG UR QL: 0.2 EU/DL
HCT VFR BLD CALC: 34.4 % — LOW (ref 34.5–45)
HCT VFR BLD CALC: 34.4 % — LOW (ref 34.5–45)
HGB BLD-MCNC: 11.3 G/DL — LOW (ref 11.5–15.5)
HGB BLD-MCNC: 11.3 G/DL — LOW (ref 11.5–15.5)
HGB UR QL STRIP.AUTO: NORMAL
IMM GRANULOCYTES NFR BLD AUTO: 0.6 % — SIGNIFICANT CHANGE UP (ref 0–0.9)
IMM GRANULOCYTES NFR BLD AUTO: 0.6 % — SIGNIFICANT CHANGE UP (ref 0–0.9)
KETONES UR-MCNC: NEGATIVE
LEUKOCYTE ESTERASE UR QL STRIP: NORMAL
LYMPHOCYTES # BLD AUTO: 1.78 K/UL — SIGNIFICANT CHANGE UP (ref 1–3.3)
LYMPHOCYTES # BLD AUTO: 1.78 K/UL — SIGNIFICANT CHANGE UP (ref 1–3.3)
LYMPHOCYTES # BLD AUTO: 21.4 % — SIGNIFICANT CHANGE UP (ref 13–44)
LYMPHOCYTES # BLD AUTO: 21.4 % — SIGNIFICANT CHANGE UP (ref 13–44)
MCHC RBC-ENTMCNC: 29 PG — SIGNIFICANT CHANGE UP (ref 27–34)
MCHC RBC-ENTMCNC: 29 PG — SIGNIFICANT CHANGE UP (ref 27–34)
MCHC RBC-ENTMCNC: 32.8 GM/DL — SIGNIFICANT CHANGE UP (ref 32–36)
MCHC RBC-ENTMCNC: 32.8 GM/DL — SIGNIFICANT CHANGE UP (ref 32–36)
MCV RBC AUTO: 88.4 FL — SIGNIFICANT CHANGE UP (ref 80–100)
MCV RBC AUTO: 88.4 FL — SIGNIFICANT CHANGE UP (ref 80–100)
MONOCYTES # BLD AUTO: 0.64 K/UL — SIGNIFICANT CHANGE UP (ref 0–0.9)
MONOCYTES # BLD AUTO: 0.64 K/UL — SIGNIFICANT CHANGE UP (ref 0–0.9)
MONOCYTES NFR BLD AUTO: 7.7 % — SIGNIFICANT CHANGE UP (ref 2–14)
MONOCYTES NFR BLD AUTO: 7.7 % — SIGNIFICANT CHANGE UP (ref 2–14)
NEUTROPHILS # BLD AUTO: 5.78 K/UL — SIGNIFICANT CHANGE UP (ref 1.8–7.4)
NEUTROPHILS # BLD AUTO: 5.78 K/UL — SIGNIFICANT CHANGE UP (ref 1.8–7.4)
NEUTROPHILS NFR BLD AUTO: 69.6 % — SIGNIFICANT CHANGE UP (ref 43–77)
NEUTROPHILS NFR BLD AUTO: 69.6 % — SIGNIFICANT CHANGE UP (ref 43–77)
NITRITE UR QL STRIP: NEGATIVE
PH UR STRIP: 7
PLATELET # BLD AUTO: 236 K/UL — SIGNIFICANT CHANGE UP (ref 150–400)
PLATELET # BLD AUTO: 236 K/UL — SIGNIFICANT CHANGE UP (ref 150–400)
PROT UR STRIP-MCNC: NEGATIVE
RBC # BLD: 3.89 M/UL — SIGNIFICANT CHANGE UP (ref 3.8–5.2)
RBC # BLD: 3.89 M/UL — SIGNIFICANT CHANGE UP (ref 3.8–5.2)
RBC # FLD: 14.9 % — HIGH (ref 10.3–14.5)
RBC # FLD: 14.9 % — HIGH (ref 10.3–14.5)
SP GR UR STRIP: 1.01
WBC # BLD: 8.31 K/UL — SIGNIFICANT CHANGE UP (ref 3.8–10.5)
WBC # BLD: 8.31 K/UL — SIGNIFICANT CHANGE UP (ref 3.8–10.5)
WBC # FLD AUTO: 8.31 K/UL — SIGNIFICANT CHANGE UP (ref 3.8–10.5)
WBC # FLD AUTO: 8.31 K/UL — SIGNIFICANT CHANGE UP (ref 3.8–10.5)

## 2023-11-27 PROCEDURE — 99213 OFFICE O/P EST LOW 20 MIN: CPT | Mod: TH,25

## 2023-11-27 PROCEDURE — 59025 FETAL NON-STRESS TEST: CPT

## 2023-11-27 RX ORDER — OXYTOCIN 10 UNIT/ML
333.33 VIAL (ML) INJECTION
Qty: 20 | Refills: 0 | Status: COMPLETED | OUTPATIENT
Start: 2023-11-27 | End: 2023-11-27

## 2023-11-27 RX ORDER — OXYTOCIN 10 UNIT/ML
2 VIAL (ML) INJECTION
Qty: 30 | Refills: 0 | Status: DISCONTINUED | OUTPATIENT
Start: 2023-11-27 | End: 2023-11-28

## 2023-11-27 RX ORDER — SODIUM CHLORIDE 9 MG/ML
1000 INJECTION, SOLUTION INTRAVENOUS
Refills: 0 | Status: DISCONTINUED | OUTPATIENT
Start: 2023-11-27 | End: 2023-11-28

## 2023-11-27 RX ORDER — CHLORHEXIDINE GLUCONATE 213 G/1000ML
1 SOLUTION TOPICAL DAILY
Refills: 0 | Status: DISCONTINUED | OUTPATIENT
Start: 2023-11-27 | End: 2023-11-28

## 2023-11-27 RX ORDER — CITRIC ACID/SODIUM CITRATE 300-500 MG
30 SOLUTION, ORAL ORAL ONCE
Refills: 0 | Status: COMPLETED | OUTPATIENT
Start: 2023-11-27 | End: 2023-11-27

## 2023-11-27 RX ADMIN — Medication 30 MILLILITER(S): at 23:16

## 2023-11-27 RX ADMIN — Medication 2 MILLIUNIT(S)/MIN: at 22:38

## 2023-11-27 RX ADMIN — SODIUM CHLORIDE 125 MILLILITER(S): 9 INJECTION, SOLUTION INTRAVENOUS at 14:54

## 2023-11-27 NOTE — OB PROVIDER H&P - ATTENDING COMMENTS
33 y.o.  at 39 weeks 4 days gestation admitted to L&D for IOL for PROM. Reactive NST.  - consenting included following discussions:   1. patient informed of reason for admission: spontaneous rutpure of membranes   2. modes and options of induction (prostaglandins, balloon, artificial rupture of membranes, and membrane sweeping) -- all explained in terms of mechanism of action/risks/benefits/alternatives  3. discussed antibiotic coverage for either GBS or in case of concern for chorioamnionitis   4. pain medication options/use/indications discussed, including Stadol IV vs Epidural (neuraxial analgesia)   5. discussed management of labor with pitocin if inadequate contraction pattern/no change  6. discussed expectations for vaginal delivery and indications for  (fetal distress, failed induction, arrest)  7. discussed blood transfusion in case of emergency, patient agreeable   8. discussed all possible procedural interventions including (AROM, internal fetal monitoring, instrumental/vaccuum assisted delivery)   9. discussed typical intrapartum/postpartum medication interventions including uterotonics, antihypertensives, anti-emetics, and pain control medications   Patient verbalized understanding of above points and agreement with above plan, consent signed.     - admission labs   - FHT: reactive and reassuring   - [place]: with irregular activity   - vertex   - GBS negative, no antibiotic prophylaxis indicated   - mild discomfort, does not currently desire analgesia   - continuous EFM/[place] until delivery   - vaginal delivery anticipated   - cytotec induction

## 2023-11-27 NOTE — OB PROVIDER H&P - HISTORY OF PRESENT ILLNESS
34yo  at 39w4d gestation sent to L&D for nonreactive NST and suspected ROM at OB office. Patient reports contractions started around 3am and  10 minutes apart. Reported thick yellow discharge and some bloody show this am.  VE: Closed/Soft/-2 (exam at office)    Uncomplicated prenatal care  Prenatal care was provided by a Midwife      Last growth sono was 23 7bls 10oz,, 3464gms 68%, posterior placenta, vtx

## 2023-11-27 NOTE — OB RN PATIENT PROFILE - FALL HARM RISK - UNIVERSAL INTERVENTIONS
Bed in lowest position, wheels locked, appropriate side rails in place/Call bell, personal items and telephone in reach/Instruct patient to call for assistance before getting out of bed or chair/Non-slip footwear when patient is out of bed/[place] to call system/Physically safe environment - no spills, clutter or unnecessary equipment/Purposeful Proactive Rounding/Room/bathroom lighting operational, light cord in reach

## 2023-11-27 NOTE — OB PROVIDER H&P - NSLOWPPHRISK_OBGYN_A_OB
No previous uterine incision/[place] Pregnancy/No known bleeding disorder No previous uterine incision/[place] Pregnancy/No known bleeding disorder/No history of postpartum hemorrhage

## 2023-11-27 NOTE — CHART NOTE - NSCHARTNOTEFT_GEN_A_CORE
FHT: baseline 130, moderate variability, +accels, -decels   [place]: CTX q2-5min    FHT Cat 1  SVE 1/50/-3  S/p buccal cytotec  Plan to start pitocin

## 2023-11-27 NOTE — OB PROVIDER H&P - ALERT: PERTINENT HISTORY
None 20 Week Level II Sonogram/BioPhysical Profile(s)/Follow up Sonogram for Growth/Non Invasive Prenatal Screen (NIPS)/Fetal Non-Stress Test (NST)

## 2023-11-27 NOTE — OB PROVIDER H&P - ASSESSMENT
Patient is a 33 y.o.  at 39 weeks 4 days gestation admitted to L&D for IOL for PROM. Reactive NST.    - consent  - admission labs  - IV hydration  - continuous toco and fetal heart monitoring  - GBS negative, no ppx required  - induction method: buccal cytotec  - pain management: epidural prn    Discussed with Dr. Whittington

## 2023-11-28 LAB
T PALLIDUM AB TITR SER: NEGATIVE — SIGNIFICANT CHANGE UP
T PALLIDUM AB TITR SER: NEGATIVE — SIGNIFICANT CHANGE UP

## 2023-11-28 PROCEDURE — 59409 OBSTETRICAL CARE: CPT | Mod: U9

## 2023-11-28 RX ORDER — LANOLIN
1 OINTMENT (GRAM) TOPICAL EVERY 6 HOURS
Refills: 0 | Status: DISCONTINUED | OUTPATIENT
Start: 2023-11-28 | End: 2023-11-30

## 2023-11-28 RX ORDER — DIBUCAINE 1 %
1 OINTMENT (GRAM) RECTAL EVERY 6 HOURS
Refills: 0 | Status: DISCONTINUED | OUTPATIENT
Start: 2023-11-28 | End: 2023-11-30

## 2023-11-28 RX ORDER — IBUPROFEN 200 MG
600 TABLET ORAL EVERY 6 HOURS
Refills: 0 | Status: DISCONTINUED | OUTPATIENT
Start: 2023-11-28 | End: 2023-11-30

## 2023-11-28 RX ORDER — DIPHENHYDRAMINE HCL 50 MG
25 CAPSULE ORAL EVERY 6 HOURS
Refills: 0 | Status: DISCONTINUED | OUTPATIENT
Start: 2023-11-28 | End: 2023-11-30

## 2023-11-28 RX ORDER — IBUPROFEN 200 MG
600 TABLET ORAL EVERY 6 HOURS
Refills: 0 | Status: COMPLETED | OUTPATIENT
Start: 2023-11-28 | End: 2024-10-26

## 2023-11-28 RX ORDER — OXYTOCIN 10 UNIT/ML
VIAL (ML) INJECTION
Qty: 30 | Refills: 0 | Status: DISCONTINUED | OUTPATIENT
Start: 2023-11-28 | End: 2023-11-28

## 2023-11-28 RX ORDER — BENZOCAINE 10 %
1 GEL (GRAM) MUCOUS MEMBRANE EVERY 6 HOURS
Refills: 0 | Status: DISCONTINUED | OUTPATIENT
Start: 2023-11-28 | End: 2023-11-30

## 2023-11-28 RX ORDER — TETANUS TOXOID, REDUCED DIPHTHERIA TOXOID AND ACELLULAR PERTUSSIS VACCINE, ADSORBED 5; 2.5; 8; 8; 2.5 [IU]/.5ML; [IU]/.5ML; UG/.5ML; UG/.5ML; UG/.5ML
0.5 SUSPENSION INTRAMUSCULAR ONCE
Refills: 0 | Status: DISCONTINUED | OUTPATIENT
Start: 2023-11-28 | End: 2023-11-30

## 2023-11-28 RX ORDER — OXYTOCIN 10 UNIT/ML
41.67 VIAL (ML) INJECTION
Qty: 20 | Refills: 0 | Status: DISCONTINUED | OUTPATIENT
Start: 2023-11-28 | End: 2023-11-30

## 2023-11-28 RX ORDER — SODIUM CHLORIDE 9 MG/ML
3 INJECTION INTRAMUSCULAR; INTRAVENOUS; SUBCUTANEOUS EVERY 8 HOURS
Refills: 0 | Status: DISCONTINUED | OUTPATIENT
Start: 2023-11-28 | End: 2023-11-30

## 2023-11-28 RX ORDER — KETOROLAC TROMETHAMINE 30 MG/ML
30 SYRINGE (ML) INJECTION ONCE
Refills: 0 | Status: DISCONTINUED | OUTPATIENT
Start: 2023-11-28 | End: 2023-11-28

## 2023-11-28 RX ORDER — SIMETHICONE 80 MG/1
80 TABLET, CHEWABLE ORAL EVERY 4 HOURS
Refills: 0 | Status: DISCONTINUED | OUTPATIENT
Start: 2023-11-28 | End: 2023-11-30

## 2023-11-28 RX ORDER — AER TRAVELER 0.5 G/1
1 SOLUTION RECTAL; TOPICAL EVERY 4 HOURS
Refills: 0 | Status: DISCONTINUED | OUTPATIENT
Start: 2023-11-28 | End: 2023-11-30

## 2023-11-28 RX ORDER — ACETAMINOPHEN 500 MG
975 TABLET ORAL
Refills: 0 | Status: DISCONTINUED | OUTPATIENT
Start: 2023-11-28 | End: 2023-11-30

## 2023-11-28 RX ORDER — OXYCODONE HYDROCHLORIDE 5 MG/1
5 TABLET ORAL
Refills: 0 | Status: DISCONTINUED | OUTPATIENT
Start: 2023-11-28 | End: 2023-11-30

## 2023-11-28 RX ORDER — OXYCODONE HYDROCHLORIDE 5 MG/1
5 TABLET ORAL ONCE
Refills: 0 | Status: DISCONTINUED | OUTPATIENT
Start: 2023-11-28 | End: 2023-11-30

## 2023-11-28 RX ORDER — MAGNESIUM HYDROXIDE 400 MG/1
30 TABLET, CHEWABLE ORAL
Refills: 0 | Status: DISCONTINUED | OUTPATIENT
Start: 2023-11-28 | End: 2023-11-30

## 2023-11-28 RX ORDER — HYDROCORTISONE 1 %
1 OINTMENT (GRAM) TOPICAL EVERY 6 HOURS
Refills: 0 | Status: DISCONTINUED | OUTPATIENT
Start: 2023-11-28 | End: 2023-11-30

## 2023-11-28 RX ORDER — PRAMOXINE HYDROCHLORIDE 150 MG/15G
1 AEROSOL, FOAM RECTAL EVERY 4 HOURS
Refills: 0 | Status: DISCONTINUED | OUTPATIENT
Start: 2023-11-28 | End: 2023-11-30

## 2023-11-28 RX ADMIN — Medication 30 MILLIGRAM(S): at 15:15

## 2023-11-28 RX ADMIN — Medication 1000 MILLIUNIT(S)/MIN: at 13:55

## 2023-11-28 RX ADMIN — Medication 600 MILLIGRAM(S): at 23:46

## 2023-11-28 RX ADMIN — Medication 2 MILLIUNIT(S)/MIN: at 00:24

## 2023-11-28 RX ADMIN — Medication 30 MILLIGRAM(S): at 14:29

## 2023-11-28 RX ADMIN — SODIUM CHLORIDE 3 MILLILITER(S): 9 INJECTION INTRAMUSCULAR; INTRAVENOUS; SUBCUTANEOUS at 22:30

## 2023-11-28 NOTE — OB PROVIDER DELIVERY SUMMARY - NSLOWPPHRISK_OBGYN_A_OB_CAL
-- DO NOT REPLY / DO NOT REPLY ALL --  -- Message is from Engagement Center Operations (ECO) --    Offered Waitlist if Available for the Visit Type? No    Caller is requesting an appointment - at a sooner time than what was available.      Caller wants sooner appointment - offered other approved options    Reason for Visit: leg swelling, circulation concerns, states when she exercises she becomes short of breath    Is the patient currently scheduled? No    Preferred time to be seen: later in day 4:30 or 5pm    Caller Information       Type Contact Phone/Fax    10/21/2022 05:17 PM CDT Phone (Incoming) Nohelia Hicks (Self) [phone] (M)          Alternative phone number: no    Can a detailed message be left? Yes    Message Turnaround:     [place]:    Please give this turnaround time to the caller:   \"This message will be sent to [state Provider's name]. The clinical team will fulfill your request as soon as they review your message.\"  
Called patient and informed her no sooner appointments at this time. Patient was added to the wait list and informed we will call if something becomes available sooner. Patient was also given new office address.   
4

## 2023-11-28 NOTE — OB RN DELIVERY SUMMARY - BABY A: STOOL IN DELIVERY
[facility] HAS ATTEMPTED TO CALL PT TO SCHEDULE. PT HAD A BIOPSY ON 1/10/2020 AND WILL CALL BACK TO SCHEDULE   no

## 2023-11-28 NOTE — OB PROVIDER DELIVERY SUMMARY - NSLOWPPHRISK_OBGYN_A_OB
No previous uterine incision/[place] Pregnancy/No known bleeding disorder/No history of postpartum hemorrhage

## 2023-11-28 NOTE — OB PROVIDER LABOR PROGRESS NOTE - NS_OBIHIFHRDETAILS_OBGYN_ALL_OB_FT
baseline 120, moderate variability, +accels, -decels
130bpm with moderate variability with accelerations and no decels
CAT 1 FHR

## 2023-11-28 NOTE — OB RN DELIVERY SUMMARY - NSSELHIDDEN_OBGYN_ALL_OB_FT
[NS_DeliveryAttending1_OBGYN_ALL_OB_FT:[id number]=],[NS_DeliveryRN_OBGYN_ALL_OB_FT:[id number]=]

## 2023-11-28 NOTE — OB RN DELIVERY SUMMARY - NS_SEPSISRSKCALC_OBGYN_ALL_OB_FT
EOS calculated successfully. EOS Risk Factor: 0.5/1000 live births ([facility] national incidence); GA=39w5d; Temp=98.78; ROM=35.817; GBS='Negative'; Antibiotics='No antibiotics or any antibiotics < 2 hrs prior to birth'

## 2023-11-28 NOTE — OB PROVIDER LABOR PROGRESS NOTE - NS_SUBJECTIVE/OBJECTIVE_OBGYN_ALL_OB_FT
Patient seen at bedside. Patient copping with pain well with epidural. Patient complaining of blood on lower back close to epidural placement. Nurse informed CRNA
Patient expressing rectal pressure.

## 2023-11-28 NOTE — OB PROVIDER LABOR PROGRESS NOTE - ASSESSMENT
34yo  at 39w5d gestation admitted for IOL for PROM in active labor, CAT 1, afebrile, GBS negative    VE: 4/80/-2, SROM @ 2:00 23    Pitocin @ 10 units    Call CRNA to check epidural placement  Reposition to from left to right  Continue current management  Anticipate natural vaginal delivery      Vita Alegre CM
32yo  at 39w5d gestation admitted for IOL for PROM in second stage, CAT 1, afebrile    VE: 10/100/-1, SROM @ 2:00 ,     Labor down for now  Anticipate normal spontaneous vaginal delivery     Vita Alegre, CM  
T Cat 1  Pitocin at 8 U  [place] AROMed clear fluid @ 4504

## 2023-11-28 NOTE — OB PROVIDER DELIVERY SUMMARY - NSPROVIDERDELIVERYNOTE_OBGYN_ALL_OB_FT
Vaginal Delivery Summary    Procedure: Normal spontaneous vaginal delivery   Findings: Viable male infant delivered in cephalic presentation at 13.49, placenta delivered at 13.55  Apgar scores 9/9  Weight will be recorded after 1 hour to allow for skin-to-skin contact.  Laceration(s):  1st degree perineal  Repair:  2-0 chromic  EBL:100  Complications: none    Procedure:   Patient felt rectal pressure and was found to be fully dilated, +2 station at 13:09. She pushed effectively for 40 minutes. She delivered a viable male infant in JUAN position with the shoulders and body fallowed. Delayed cord clamping was performed for 45 seconds. Placenta delivered intact and pitocin was started at the delivery of placenta at 13:55. Perineum and vagina were inspected,  1st degree laceration present. Laceration repaired and adequate hemostasis was obtained. Mother and baby bonded and will be transferred to mother baby unit.     ml    Vita Alegre CM

## 2023-11-28 NOTE — OB PROVIDER DELIVERY SUMMARY - NSSELHIDDEN_OBGYN_ALL_OB_FT
[NS_DeliveryAttending1_OBGYN_ALL_OB_FT:[id number]=],[NS_DeliveryRN_OBGYN_ALL_OB_FT:[id number]=]

## 2023-11-29 ENCOUNTER — TRANSCRIPTION ENCOUNTER (OUTPATIENT)
Age: 33
End: 2023-11-29

## 2023-11-29 LAB
HCT VFR BLD CALC: 31.4 % — LOW (ref 34.5–45)
HCT VFR BLD CALC: 31.4 % — LOW (ref 34.5–45)
HGB BLD-MCNC: 10.4 G/DL — LOW (ref 11.5–15.5)
HGB BLD-MCNC: 10.4 G/DL — LOW (ref 11.5–15.5)

## 2023-11-29 RX ORDER — IBUPROFEN 200 MG
1 TABLET ORAL
Qty: 20 | Refills: 0
Start: 2023-11-29 | End: 2023-12-03

## 2023-11-29 RX ORDER — ACETAMINOPHEN 500 MG
3 TABLET ORAL
Qty: 60 | Refills: 0
Start: 2023-11-29 | End: 2023-12-03

## 2023-11-29 RX ADMIN — SODIUM CHLORIDE 3 MILLILITER(S): 9 INJECTION INTRAMUSCULAR; INTRAVENOUS; SUBCUTANEOUS at 13:40

## 2023-11-29 RX ADMIN — Medication 975 MILLIGRAM(S): at 09:33

## 2023-11-29 RX ADMIN — SODIUM CHLORIDE 3 MILLILITER(S): 9 INJECTION INTRAMUSCULAR; INTRAVENOUS; SUBCUTANEOUS at 06:14

## 2023-11-29 RX ADMIN — Medication 1 TABLET(S): at 13:15

## 2023-11-29 RX ADMIN — Medication 975 MILLIGRAM(S): at 15:30

## 2023-11-29 RX ADMIN — Medication 975 MILLIGRAM(S): at 21:58

## 2023-11-29 RX ADMIN — Medication 600 MILLIGRAM(S): at 06:02

## 2023-11-29 RX ADMIN — Medication 600 MILLIGRAM(S): at 23:15

## 2023-11-29 RX ADMIN — Medication 600 MILLIGRAM(S): at 13:16

## 2023-11-29 RX ADMIN — Medication 600 MILLIGRAM(S): at 17:34

## 2023-11-29 NOTE — DISCHARGE NOTE OB - CARE PLAN
Principal Discharge DX:	Delivery normal  Assessment and plan of treatment:	1) Please take ibuprofen and/or Tylenol as needed for pain as prescribed.  2) Nothing in the vagina for 6 weeks (including no sex, no tampons, and no douching).  3) Please call your doctor for a follow up your postpartum appointment in 4-6 weeks.  4) Please continue taking vitamins postpartum.   5) Please call the office sooner if you have heavy vaginal bleeding, severe abdominal pain, or fever > 100.4F.  6) You may resume regular daily activity as tolerated   1

## 2023-11-29 NOTE — PROGRESS NOTE ADULT - ASSESSMENT
A/P:  JOSÉ ANTONIO RUANO is a 33y  now PPD#1 s/p spontaneous vaginal delivery at 39w4d gestation for PROM, uncomplicated delivery  -Vital signs stable  -Hgb: 11.3 -> AM labs pending   -Voiding, tolerating PO  -Advance care as tolerated   -Continue routine postpartum care and education  -Healthy male infant, declines circumcision    -Dispo: Patient to be discharged when meeting all postpartum milestones and pending attending approval.

## 2023-11-29 NOTE — DISCHARGE NOTE OB - PATIENT PORTAL LINK FT
You can access the FollowMyHealth Patient Portal offered by [facility] by registering at the following website: [email or web address]. By joining [facility] FollowMyHealth portal, you will also be able to view your health information using other applications (apps) compatible with our system.

## 2023-11-29 NOTE — DISCHARGE NOTE OB - NS MD DC FALL RISK RISK
For information on Fall & Injury Prevention, visit: [email or web address] OR  [email or web address] OR  https://www.cdc.gov/steadi/patient.html

## 2023-11-29 NOTE — DISCHARGE NOTE OB - MEDICATION SUMMARY - MEDICATIONS TO TAKE
I will START or STAY ON the medications listed below when I get home from the hospital:    ibuprofen 600 mg oral tablet  -- 1 tab(s) by mouth every 6 hours as needed for  pain  -- Indication: For Pain    Tylenol 325 mg oral tablet  -- 3 tab(s) by mouth every 6 hours as needed for  pain  -- Indication: For Pain

## 2023-11-29 NOTE — DISCHARGE NOTE OB - CARE PROVIDER_API CALL
Jerry Tinsley  Obstetrics and Gynecology  [address] [place] [address]  Phone: [phone]  Fax: [phone]  Follow Up Time:

## 2023-11-30 VITALS
TEMPERATURE: 98 F | SYSTOLIC BLOOD PRESSURE: 116 MMHG | DIASTOLIC BLOOD PRESSURE: 77 MMHG | HEART RATE: 79 BPM | RESPIRATION RATE: 18 BRPM | OXYGEN SATURATION: 98 %

## 2023-11-30 LAB
BILIRUB SERPL-MCNC: <0.2 MG/DL — LOW (ref 0.4–2)
BILIRUB SERPL-MCNC: <0.2 MG/DL — LOW (ref 0.4–2)
CREAT SERPL-MCNC: 0.58 MG/DL — SIGNIFICANT CHANGE UP (ref 0.5–1.3)
CREAT SERPL-MCNC: 0.58 MG/DL — SIGNIFICANT CHANGE UP (ref 0.5–1.3)
EGFR: 124 ML/MIN/1.73M2 — SIGNIFICANT CHANGE UP
EGFR: 124 ML/MIN/1.73M2 — SIGNIFICANT CHANGE UP
INR BLD: 0.92 RATIO — SIGNIFICANT CHANGE UP (ref 0.85–1.18)
INR BLD: 0.92 RATIO — SIGNIFICANT CHANGE UP (ref 0.85–1.18)
MELD SCORE WITH DIALYSIS: 20 POINTS — SIGNIFICANT CHANGE UP
MELD SCORE WITH DIALYSIS: 20 POINTS — SIGNIFICANT CHANGE UP
MELD SCORE WITHOUT DIALYSIS: 6 POINTS — SIGNIFICANT CHANGE UP
MELD SCORE WITHOUT DIALYSIS: 6 POINTS — SIGNIFICANT CHANGE UP
PROTHROM AB SERPL-ACNC: 10.2 SEC — SIGNIFICANT CHANGE UP (ref 9.5–13)
PROTHROM AB SERPL-ACNC: 10.2 SEC — SIGNIFICANT CHANGE UP (ref 9.5–13)
SODIUM SERPL-SCNC: 140 MMOL/L — SIGNIFICANT CHANGE UP (ref 135–145)
SODIUM SERPL-SCNC: 140 MMOL/L — SIGNIFICANT CHANGE UP (ref 135–145)

## 2023-11-30 PROCEDURE — 85014 HEMATOCRIT: CPT

## 2023-11-30 PROCEDURE — 85025 COMPLETE CBC W/AUTO DIFF WBC: CPT

## 2023-11-30 PROCEDURE — 36415 COLL VENOUS BLD VENIPUNCTURE: CPT

## 2023-11-30 PROCEDURE — 86780 TREPONEMA PALLIDUM: CPT

## 2023-11-30 PROCEDURE — 86901 BLOOD TYPING SEROLOGIC RH(D): CPT

## 2023-11-30 PROCEDURE — 86900 BLOOD TYPING SEROLOGIC ABO: CPT

## 2023-11-30 PROCEDURE — 86850 RBC ANTIBODY SCREEN: CPT

## 2023-11-30 PROCEDURE — 85018 HEMOGLOBIN: CPT

## 2023-11-30 PROCEDURE — T1013: CPT

## 2023-11-30 RX ADMIN — Medication 975 MILLIGRAM(S): at 08:56

## 2023-11-30 NOTE — PROGRESS NOTE ADULT - ASSESSMENT
A/P:  JOSÉ ANTONIO RUANO is a 33y  now PPD#2 s/p spontaneous vaginal delivery at 39w4d gestation for PROM, uncomplicated delivery  -Vital signs stable  -Hgb: 11.3 -> 10.4  -Voiding, tolerating PO  -Advance care as tolerated   -Continue routine postpartum care and education  -Healthy male infant, declines circumcision    -Dispo: Patient to be discharged home today pending attending approval.

## 2023-11-30 NOTE — PROGRESS NOTE ADULT - ATTENDING COMMENTS
Agree with above assessment and plan.  Pt is a 33y  now PPD#1 s/p spontaneous vaginal delivery at 39w4d gestation for PROM, uncomplicated delivery  She feels well on PPD 1 and is without complaints.  Her pain is well controlled and her bleeding is not heavy  Will continue routine PP care  Discharge planning on PPD 2    GABINO Dominguez
33y  now stable PPD#2 s/p spontaneous vaginal delivery, discharge instructions reviewed through .

## 2023-11-30 NOTE — PROGRESS NOTE ADULT - SUBJECTIVE AND OBJECTIVE BOX
JOSÉ ANTONIO RUANO is a 33y  now PPD#1 s/p spontaneous vaginal delivery at 39w4d gestation for PROM, uncomplicated delivery    S:    No acute events overnight.   The patient has no complaints.  Pain controlled with current treatment regimen.   She is ambulating without difficulty and tolerating PO.   + flatus/-BM/+ voiding   She endorses appropriate lochia, which is decreasing.   She is breastfeeding without difficulty.   She denies fevers, chills, nausea and vomiting.   She denies lightheadedness, dizziness, palpitations, chest pain and SOB.     O:    T(C): 36.6 (23 @ 17:00), Max: 37.1 (23 @ 14:12)  HR: 83 (23 @ 17:00) (68 - 126)  BP: 110/69 (23 @ 17:00) (97/52 - 182/77)  RR: 16 (23 @ 17:00) (16 - 18)  SpO2: 98% (23 @ 17:00) (96% - 100%)    Gen: NAD, AOx3, resting comfortably on room air   Abdomen:  Soft, non-tender, non-distended  Uterus:  Fundus firm below umbilicus  VE:  Expected lochia  Ext:  b/l LE non-tender                           11.3   8.31  )-----------( 236      ( 2023 14:00 )             34.4               
INTERVAL HPI/OVERNIGHT EVENTS:  33y Female s/p labor epidural on 11/27/2023    Vital Signs Last 24 Hrs  T(C): 36.8 (29 Nov 2023 15:40), Max: 36.8 (29 Nov 2023 06:00)  T(F): 98.2 (29 Nov 2023 15:40), Max: 98.3 (29 Nov 2023 06:00)  HR: 94 (29 Nov 2023 15:40) (94 - 97)  BP: 117/76 (29 Nov 2023 15:40) (115/77 - 117/76)  BP(mean): --  RR: 18 (29 Nov 2023 15:40) (18 - 18)  SpO2: 99% (29 Nov 2023 15:40) (98% - 99%)            Patient's overall anesthesia satisfaction: Positive    Patient doing well     No headache      No residual numbness or weakness, sensory and motor function intact    No anesthetic complications or complaints noted or reported                 
JOSÉ ANTONIO RUANO is a 33y  now PPD#2 s/p spontaneous vaginal delivery at 39w4d gestation for PROM, uncomplicated delivery    S:    No acute events overnight.   The patient has no complaints.  Pain controlled with current treatment regimen.   She is ambulating without difficulty and tolerating PO.   + flatus/-BM/+ voiding   She endorses appropriate lochia, which is decreasing.   She is breastfeeding without difficulty.   She denies fevers, chills, nausea and vomiting.   She denies lightheadedness, dizziness, palpitations, chest pain and SOB.     O:      Vital Signs Last 24 Hrs  T(C): 36.8 (2023 04:20), Max: 36.8 (2023 15:40)  T(F): 98.3 (2023 04:20), Max: 98.3 (2023 04:20)  HR: 79 (2023 04:20) (79 - 94)  BP: 116/77 (2023 04:20) (116/77 - 117/76)  RR: 18 (2023 04:20) (18 - 18)  SpO2: 98% (2023 04:20) (98% - 99%)    Gen: NAD, AOx3, resting comfortably on room air   Abdomen:  Soft, non-tender, non-distended  Uterus:  Fundus firm below umbilicus  VE:  Expected lochia  Ext:  b/l LE non-tender                           11.3   8.31  )-----------( 236      ( 2023 14:00 )             34.4

## 2023-12-01 ENCOUNTER — APPOINTMENT (OUTPATIENT)
Dept: ANTEPARTUM | Facility: CLINIC | Age: 33
End: 2023-12-01

## 2023-12-01 PROBLEM — Z87.19 PERSONAL HISTORY OF OTHER DISEASES OF THE DIGESTIVE SYSTEM: Chronic | Status: ACTIVE | Noted: 2023-11-27

## 2023-12-01 PROBLEM — E28.2 POLYCYSTIC OVARIAN SYNDROME: Chronic | Status: ACTIVE | Noted: 2023-11-27

## 2023-12-04 ENCOUNTER — APPOINTMENT (OUTPATIENT)
Dept: OBGYN | Facility: CLINIC | Age: 33
End: 2023-12-04
Payer: MEDICAID

## 2023-12-04 VITALS
WEIGHT: 175 LBS | HEIGHT: 64 IN | BODY MASS INDEX: 29.88 KG/M2 | DIASTOLIC BLOOD PRESSURE: 88 MMHG | SYSTOLIC BLOOD PRESSURE: 130 MMHG

## 2023-12-04 DIAGNOSIS — Z3A.39 39 WEEKS GESTATION OF PREGNANCY: ICD-10-CM

## 2023-12-04 PROCEDURE — 99213 OFFICE O/P EST LOW 20 MIN: CPT | Mod: TH

## 2023-12-04 RX ORDER — LANOLIN
OINTMENT (GRAM) TOPICAL
Qty: 7 | Refills: 0 | Status: ACTIVE | COMMUNITY
Start: 2023-12-04 | End: 1900-01-01

## 2023-12-05 ENCOUNTER — APPOINTMENT (OUTPATIENT)
Age: 33
End: 2023-12-05
Payer: MEDICAID

## 2023-12-05 PROCEDURE — S9445: CPT | Mod: 95

## 2023-12-12 ENCOUNTER — APPOINTMENT (OUTPATIENT)
Age: 33
End: 2023-12-12
Payer: MEDICAID

## 2023-12-12 PROCEDURE — S9445: CPT | Mod: 95

## 2023-12-12 PROCEDURE — S9443A: CUSTOM | Mod: 95

## 2024-01-08 ENCOUNTER — APPOINTMENT (OUTPATIENT)
Dept: OBGYN | Facility: CLINIC | Age: 34
End: 2024-01-08
Payer: MEDICAID

## 2024-01-08 VITALS
HEIGHT: 64 IN | WEIGHT: 177 LBS | SYSTOLIC BLOOD PRESSURE: 100 MMHG | DIASTOLIC BLOOD PRESSURE: 76 MMHG | BODY MASS INDEX: 30.22 KG/M2

## 2024-01-08 DIAGNOSIS — Z30.09 ENCOUNTER FOR OTHER GENERAL COUNSELING AND ADVICE ON CONTRACEPTION: ICD-10-CM

## 2024-01-08 DIAGNOSIS — K59.09 OTHER CONSTIPATION: ICD-10-CM

## 2024-01-08 DIAGNOSIS — O92.119: ICD-10-CM

## 2024-01-08 DIAGNOSIS — Z34.93 ENCOUNTER FOR SUPERVISION OF NORMAL PREGNANCY, UNSPECIFIED, THIRD TRIMESTER: ICD-10-CM

## 2024-01-08 DIAGNOSIS — O21.9 VOMITING OF PREGNANCY, UNSPECIFIED: ICD-10-CM

## 2024-01-08 DIAGNOSIS — Z34.00 ENCOUNTER FOR SUPERVISION OF NORMAL FIRST PREGNANCY, UNSPECIFIED TRIMESTER: ICD-10-CM

## 2024-01-08 DIAGNOSIS — Z30.011 ENCOUNTER FOR INITIAL PRESCRIPTION OF CONTRACEPTIVE PILLS: ICD-10-CM

## 2024-01-08 PROCEDURE — 99213 OFFICE O/P EST LOW 20 MIN: CPT | Mod: TH

## 2024-01-08 RX ORDER — NORETHINDRONE 0.35 MG/1
0.35 TABLET ORAL
Qty: 28 | Refills: 5 | Status: ACTIVE | COMMUNITY
Start: 2024-01-08 | End: 1900-01-01

## 2024-01-08 RX ORDER — POLYETHYLENE GLYCOL 3350 17 G/17G
17 POWDER, FOR SOLUTION ORAL DAILY
Qty: 30 | Refills: 6 | Status: ACTIVE | COMMUNITY
Start: 2024-01-08 | End: 1900-01-01

## 2024-04-24 ENCOUNTER — APPOINTMENT (OUTPATIENT)
Dept: OBGYN | Facility: CLINIC | Age: 34
End: 2024-04-24
Payer: MEDICAID

## 2024-04-24 VITALS
WEIGHT: 180.4 LBS | SYSTOLIC BLOOD PRESSURE: 119 MMHG | DIASTOLIC BLOOD PRESSURE: 82 MMHG | HEIGHT: 64 IN | BODY MASS INDEX: 30.8 KG/M2 | HEART RATE: 79 BPM

## 2024-04-24 DIAGNOSIS — Z12.4 ENCOUNTER FOR SCREENING FOR MALIGNANT NEOPLASM OF CERVIX: ICD-10-CM

## 2024-04-24 DIAGNOSIS — Z30.09 ENCOUNTER FOR OTHER GENERAL COUNSELING AND ADVICE ON CONTRACEPTION: ICD-10-CM

## 2024-04-24 DIAGNOSIS — Z11.3 ENCOUNTER FOR SCREENING FOR INFECTIONS WITH A PREDOMINANTLY SEXUAL MODE OF TRANSMISSION: ICD-10-CM

## 2024-04-24 PROCEDURE — 99395 PREV VISIT EST AGE 18-39: CPT

## 2024-04-24 RX ORDER — NORGESTIMATE AND ETHINYL ESTRADIOL 7DAYSX3 LO
0.18/0.215/0.25 KIT ORAL DAILY
Qty: 1 | Refills: 11 | Status: ACTIVE | COMMUNITY
Start: 2024-04-24 | End: 1900-01-01

## 2024-04-24 NOTE — HISTORY OF PRESENT ILLNESS
[FreeTextEntry1] : 35yo  presents for annual GYN exam. Using micronor for birth control and no longer breastfeeding. Denies any complaints at this time. LMP 2024

## 2024-04-24 NOTE — COUNSELING
[Nutrition/ Exercise/ Weight Management] : nutrition, exercise, weight management [Body Image] : body image [Vitamins/Supplements] : vitamins/supplements [Breast Self Exam] : breast self exam [Contraception/ Emergency Contraception/ Safe Sexual Practices] : contraception, emergency contraception, safe sexual practices [Confidentiality] : confidentiality [Lab Results] : lab results [Medication Management] : medication management

## 2024-04-24 NOTE — PHYSICAL EXAM
[Appropriately responsive] : appropriately responsive [Alert] : alert [No Acute Distress] : no acute distress [Regular Rate Rhythm] : regular rate rhythm [Soft] : soft [Non-tender] : non-tender [Non-distended] : non-distended [No HSM] : No HSM [No Lesions] : no lesions [No Mass] : no mass [Oriented x3] : oriented x3 [Examination Of The Breasts] : a normal appearance [No Masses] : no breast masses were palpable [Labia Majora] : normal [Labia Minora] : normal [Normal] : normal [Uterine Adnexae] : normal

## 2024-04-24 NOTE — PLAN
[FreeTextEntry1] : PLAN  PAP smear obtianed GC/CT obtained declined STI screening via blood work  DIscussed she can start birth control [facility] OCP once she finishes micronor. Discussed R/B?A of birth control Educated about warning signs of birth control  RTO 6 months for refill of birth control

## 2024-04-25 LAB
C TRACH RRNA SPEC QL NAA+PROBE: NOT DETECTED
HPV HIGH+LOW RISK DNA PNL CVX: NOT DETECTED
N GONORRHOEA RRNA SPEC QL NAA+PROBE: NOT DETECTED
SOURCE TP AMPLIFICATION: NORMAL

## 2024-04-29 LAB — CYTOLOGY CVX/VAG DOC THIN PREP: NORMAL

## 2024-05-02 ENCOUNTER — APPOINTMENT (OUTPATIENT)
Dept: OBGYN | Facility: CLINIC | Age: 34
End: 2024-05-02

## 2024-05-22 ENCOUNTER — APPOINTMENT (OUTPATIENT)
Dept: INTERNAL MEDICINE | Facility: CLINIC | Age: 34
End: 2024-05-22

## 2024-08-29 ENCOUNTER — APPOINTMENT (OUTPATIENT)
Dept: FAMILY MEDICINE | Facility: CLINIC | Age: 34
End: 2024-08-29
Payer: MEDICAID

## 2024-08-29 VITALS
HEART RATE: 79 BPM | DIASTOLIC BLOOD PRESSURE: 70 MMHG | HEIGHT: 64 IN | BODY MASS INDEX: 29.19 KG/M2 | RESPIRATION RATE: 14 BRPM | WEIGHT: 171 LBS | SYSTOLIC BLOOD PRESSURE: 110 MMHG | OXYGEN SATURATION: 98 %

## 2024-08-29 DIAGNOSIS — Z13.31 ENCOUNTER FOR SCREENING FOR DEPRESSION: ICD-10-CM

## 2024-08-29 DIAGNOSIS — Z00.00 ENCOUNTER FOR GENERAL ADULT MEDICAL EXAMINATION W/OUT ABNORMAL FINDINGS: ICD-10-CM

## 2024-08-29 PROCEDURE — G0444 DEPRESSION SCREEN ANNUAL: CPT | Mod: 59

## 2024-08-29 PROCEDURE — 99385 PREV VISIT NEW AGE 18-39: CPT | Mod: 25

## 2024-08-29 NOTE — HISTORY OF PRESENT ILLNESS
[FreeTextEntry1] : Establish PCP [de-identified] : 35 y/o HF originally from [place] here to establish PCP and CPE. No significant past medical hx. .

## 2024-08-29 NOTE — COUNSELING
[Fall prevention counseling provided] : Fall prevention counseling provided [Adequate lighting] : Adequate lighting [Behavioral health counseling provided] : Behavioral health counseling provided [Potential consequences of obesity discussed] : Potential consequences of obesity discussed [Benefits of weight loss discussed] : Benefits of weight loss discussed [Encouraged to maintain food diary] : Encouraged to maintain food diary [Encouraged to increase physical activity] : Encouraged to increase physical activity [Target Wt Loss Goal ___] : Weight Loss Goals: Target weight loss goal [unfilled] lbs [Weigh Self Weekly] : weigh self weekly [Decrease Portions] : decrease portions [____ min/wk Activity] : [unfilled] min/wk activity [None] : None [Good understanding] : Patient has a good understanding of lifestyle changes and steps needed to achieve self management goal

## 2024-08-29 NOTE — ASSESSMENT
[FreeTextEntry1] : 33 y/o HF here to establish care  HCM: -Blood and UA today -GYN UTD 2024 -Immunizations: UTD -Depression screening; score 0  # Overweight: -Weight counseling  Further recommendations with lab results.

## 2024-08-29 NOTE — HEALTH RISK ASSESSMENT
[Good] : ~his/her~  mood as  good [No] : In the past 12 months have you used drugs other than those required for medical reasons? No [No falls in past year] : Patient reported no falls in the past year [0] : 2) Feeling down, depressed, or hopeless: Not at all (0) [PHQ-2 Negative - No further assessment needed] : PHQ-2 Negative - No further assessment needed [Patient reported PAP Smear was normal] : Patient reported PAP Smear was normal [HIV test declined] : HIV test declined [Hepatitis C test declined] : Hepatitis C test declined [None] : None [With Family] : lives with family [Unemployed] : unemployed [] :  [# Of Children ___] : has [unfilled] children [Sexually Active] : sexually active [Feels Safe at Home] : Feels safe at home [Fully functional (bathing, dressing, toileting, transferring, walking, feeding)] : Fully functional (bathing, dressing, toileting, transferring, walking, feeding) [Fully functional (using the telephone, shopping, preparing meals, housekeeping, doing laundry, using] : Fully functional and needs no help or supervision to perform IADLs (using the telephone, shopping, preparing meals, housekeeping, doing laundry, using transportation, managing medications and managing finances) [Smoke Detector] : smoke detector [Safety elements used in home] : safety elements used in home [Seat Belt] :  uses seat belt [With Patient/Caregiver] : , with patient/caregiver [Designated Healthcare Proxy] : Designated healthcare proxy [Name: ___] : Health Care Proxy's Name: [unfilled]  [Relationship: ___] : Relationship: [unfilled] [Never] : Never [Time Spent: ___ Minutes] : I spent [unfilled] minutes performing a depression screening for this patient. [de-identified] : just started [de-identified] : watch diet [[id number]] : 0 [Reports changes in hearing] : Reports no changes in hearing [Reports changes in vision] : Reports no changes in vision [Reports changes in dental health] : Reports no changes in dental health [TB Exposure] : is not being exposed to tuberculosis [PapSmearDate] : 04/24 [de-identified] : spouse and baby [AdvancecareDate] : 08/24

## 2024-08-30 ENCOUNTER — NON-APPOINTMENT (OUTPATIENT)
Age: 34
End: 2024-08-30

## 2024-08-30 LAB
ALBUMIN SERPL ELPH-MCNC: 4.7 G/DL
ALP BLD-CCNC: 76 U/L
ALT SERPL-CCNC: 26 U/L
ANION GAP SERPL CALC-SCNC: 12 MMOL/L
AST SERPL-CCNC: 20 U/L
BASOPHILS # BLD AUTO: 0.03 K/UL
BASOPHILS NFR BLD AUTO: 0.6 %
BILIRUB SERPL-MCNC: 0.4 MG/DL
BUN SERPL-MCNC: 9 MG/DL
CALCIUM SERPL-MCNC: 9.3 MG/DL
CHLORIDE SERPL-SCNC: 105 MMOL/L
CO2 SERPL-SCNC: 22 MMOL/L
CREAT SERPL-MCNC: 0.76 MG/DL
EGFR: 107 ML/MIN/1.73M2
EOSINOPHIL # BLD AUTO: 0.11 K/UL
EOSINOPHIL NFR BLD AUTO: 2.1 %
ESTIMATED AVERAGE GLUCOSE: 100 MG/DL
GLUCOSE SERPL-MCNC: 79 MG/DL
HBA1C MFR BLD HPLC: 5.1 %
HCT VFR BLD CALC: 40.6 %
HGB BLD-MCNC: 13.2 G/DL
IMM GRANULOCYTES NFR BLD AUTO: 0.2 %
LYMPHOCYTES # BLD AUTO: 1.96 K/UL
LYMPHOCYTES NFR BLD AUTO: 36.6 %
MAN DIFF?: NORMAL
MCHC RBC-ENTMCNC: 29.9 PG
MCHC RBC-ENTMCNC: 32.5 GM/DL
MCV RBC AUTO: 91.9 FL
MONOCYTES # BLD AUTO: 0.43 K/UL
MONOCYTES NFR BLD AUTO: 8 %
NEUTROPHILS # BLD AUTO: 2.82 K/UL
NEUTROPHILS NFR BLD AUTO: 52.5 %
PLATELET # BLD AUTO: 269 K/UL
POTASSIUM SERPL-SCNC: 4.1 MMOL/L
PROT SERPL-MCNC: 7.9 G/DL
RBC # BLD: 4.42 M/UL
RBC # FLD: 12.9 %
SODIUM SERPL-SCNC: 139 MMOL/L
TSH SERPL-ACNC: 0.99 UIU/ML
WBC # FLD AUTO: 5.36 K/UL

## 2025-01-02 ENCOUNTER — APPOINTMENT (OUTPATIENT)
Dept: OBGYN | Facility: CLINIC | Age: 35
End: 2025-01-02
Payer: COMMERCIAL

## 2025-01-02 VITALS
HEIGHT: 64 IN | WEIGHT: 177.8 LBS | BODY MASS INDEX: 30.35 KG/M2 | DIASTOLIC BLOOD PRESSURE: 84 MMHG | SYSTOLIC BLOOD PRESSURE: 120 MMHG

## 2025-01-02 DIAGNOSIS — Z30.41 ENCOUNTER FOR SURVEILLANCE OF CONTRACEPTIVE PILLS: ICD-10-CM

## 2025-01-02 PROCEDURE — 99213 OFFICE O/P EST LOW 20 MIN: CPT

## 2025-01-02 PROCEDURE — 99459 PELVIC EXAMINATION: CPT

## 2025-01-02 RX ORDER — NORETHINDRONE ACETATE AND ETHINYL ESTRADIOL AND FERROUS FUMARATE 1MG-20(21)
1-20 KIT ORAL
Qty: 3 | Refills: 1 | Status: ACTIVE | COMMUNITY
Start: 2025-01-02 | End: 1900-01-01
